# Patient Record
Sex: MALE | Race: WHITE | NOT HISPANIC OR LATINO | URBAN - METROPOLITAN AREA
[De-identification: names, ages, dates, MRNs, and addresses within clinical notes are randomized per-mention and may not be internally consistent; named-entity substitution may affect disease eponyms.]

---

## 2022-11-21 ENCOUNTER — OFFICE VISIT (OUTPATIENT)
Dept: PODIATRY | Facility: CLINIC | Age: 57
End: 2022-11-21
Payer: OTHER GOVERNMENT

## 2022-11-21 ENCOUNTER — HOSPITAL ENCOUNTER (OUTPATIENT)
Dept: RADIOLOGY | Facility: HOSPITAL | Age: 57
Discharge: HOME | End: 2022-11-21
Attending: PODIATRIST
Payer: OTHER GOVERNMENT

## 2022-11-21 VITALS
WEIGHT: 200 LBS | HEIGHT: 69 IN | SYSTOLIC BLOOD PRESSURE: 139 MMHG | BODY MASS INDEX: 29.62 KG/M2 | HEART RATE: 77 BPM | DIASTOLIC BLOOD PRESSURE: 82 MMHG

## 2022-11-21 DIAGNOSIS — M79.672 FOOT PAIN, BILATERAL: ICD-10-CM

## 2022-11-21 DIAGNOSIS — M79.671 FOOT PAIN, BILATERAL: ICD-10-CM

## 2022-11-21 DIAGNOSIS — G60.9 IDIOPATHIC PERIPHERAL NEUROPATHY: ICD-10-CM

## 2022-11-21 DIAGNOSIS — I87.2 VENOUS INSUFFICIENCY OF BOTH LOWER EXTREMITIES: ICD-10-CM

## 2022-11-21 DIAGNOSIS — L60.2 ONYCHOGRYPHOSIS: ICD-10-CM

## 2022-11-21 DIAGNOSIS — B35.1 ONYCHOMYCOSIS: Primary | ICD-10-CM

## 2022-11-21 PROCEDURE — 73630 XR FOOT COMPLETE 3 VIEW BILATERAL: ICD-10-PCS | Mod: 26,50,, | Performed by: RADIOLOGY

## 2022-11-21 PROCEDURE — 73630 X-RAY EXAM OF FOOT: CPT | Mod: TC,50,FY

## 2022-11-21 PROCEDURE — 73630 X-RAY EXAM OF FOOT: CPT | Mod: 26,50,, | Performed by: RADIOLOGY

## 2022-11-21 PROCEDURE — 11721 ROUTINE FOOT CARE: ICD-10-PCS | Mod: S$PBB,,, | Performed by: PODIATRIST

## 2022-11-21 PROCEDURE — 99203 PR OFFICE/OUTPT VISIT, NEW, LEVL III, 30-44 MIN: ICD-10-PCS | Mod: 25,S$PBB,, | Performed by: PODIATRIST

## 2022-11-21 PROCEDURE — 99204 OFFICE O/P NEW MOD 45 MIN: CPT | Mod: PBBFAC,PN | Performed by: PODIATRIST

## 2022-11-21 PROCEDURE — 99999 PR PBB SHADOW E&M-NEW PATIENT-LVL IV: CPT | Mod: PBBFAC,,, | Performed by: PODIATRIST

## 2022-11-21 PROCEDURE — 99203 OFFICE O/P NEW LOW 30 MIN: CPT | Mod: 25,S$PBB,, | Performed by: PODIATRIST

## 2022-11-21 PROCEDURE — 11721 DEBRIDE NAIL 6 OR MORE: CPT | Mod: PBBFAC,PN | Performed by: PODIATRIST

## 2022-11-21 PROCEDURE — 99999 PR PBB SHADOW E&M-NEW PATIENT-LVL IV: ICD-10-PCS | Mod: PBBFAC,,, | Performed by: PODIATRIST

## 2022-11-21 RX ORDER — CARVEDILOL 25 MG/1
1 TABLET ORAL 2 TIMES DAILY
COMMUNITY
Start: 2022-06-10

## 2022-11-21 RX ORDER — ALLOPURINOL 300 MG/1
300 TABLET ORAL DAILY
COMMUNITY

## 2022-11-21 RX ORDER — LISINOPRIL 40 MG/1
TABLET ORAL
COMMUNITY
Start: 2022-06-10 | End: 2023-06-11

## 2022-11-21 RX ORDER — OMEPRAZOLE 20 MG/1
CAPSULE, DELAYED RELEASE ORAL
COMMUNITY
Start: 2021-12-09 | End: 2022-12-10

## 2022-11-21 RX ORDER — FUROSEMIDE 40 MG/1
0.5 TABLET ORAL DAILY
COMMUNITY
Start: 2021-12-09 | End: 2022-12-10

## 2022-11-21 RX ORDER — HYDROXYCHLOROQUINE SULFATE 200 MG/1
TABLET, FILM COATED ORAL
COMMUNITY
Start: 2022-01-14 | End: 2023-01-15

## 2022-11-21 RX ORDER — FLUOXETINE HYDROCHLORIDE 20 MG/1
20 CAPSULE ORAL
COMMUNITY
Start: 2022-10-06

## 2022-11-21 RX ORDER — CARVEDILOL 25 MG/1
1 TABLET ORAL 2 TIMES DAILY
COMMUNITY
Start: 2022-06-10 | End: 2022-11-21 | Stop reason: SDUPTHER

## 2022-11-21 RX ORDER — FOLIC ACID 1 MG/1
1 TABLET ORAL DAILY
COMMUNITY

## 2022-11-21 NOTE — PROCEDURES
"Routine Foot Care    Date/Time: 11/21/2022 2:45 PM  Performed by: Payam Gordon DPM  Authorized by: Payam Gordon DPM     Time out: Immediately prior to procedure a "time out" was called to verify the correct patient, procedure, equipment, support staff and site/side marked as required.    Consent Done?:  Yes (Verbal)  Hyperkeratotic Skin Lesions?: No      Nail Care Type:  Debride  Location(s): All  (Left 1st Toe, Left 3rd Toe, Left 2nd Toe, Left 4th Toe, Left 5th Toe, Right 1st Toe, Right 2nd Toe, Right 3rd Toe, Right 4th Toe and Right 5th Toe)  Patient tolerance:  Patient tolerated the procedure well with no immediate complications     Used sterile nail nipper.      "

## 2022-11-21 NOTE — PROGRESS NOTES
"Subjective:      Patient ID: Sharif Castillo is a 57 y.o. male.    Chief Complaint: Routine Foot Care (Routine Foot Exam)    History of lupus and cardiomyopathy complaining of chronic intermittent pain to both feet in the arch after long periods of standing and walking throughout the day.  States that he achieves greater than 10,000 steps per day.  Works as a  at the [employer]. Retired .  Pain alleviated by rest.  Also complains of thickened in abnormally shaped toenails of both feet.    Vitals:    11/21/22 1501   BP: 139/82   Pulse: 77   Weight: 90.7 kg (200 lb)   Height: 5' 9" (1.753 m)   PainSc:   6   PainLoc: Foot      Past Medical History:   Diagnosis Date    CHF (congestive heart failure)     Chronic kidney disease, unspecified     Fatty liver     GERD (gastroesophageal reflux disease)     Gout, unspecified     Hypertension     Hypothyroidism, unspecified     Paroxysmal atrial fibrillation     PTSD (post-traumatic stress disorder)     Systemic lupus erythematosus, organ or system involvement unspecified        Past Surgical History:   Procedure Laterality Date    CARDIAC CATHETERIZATION      COLONOSCOPY      endoscopy      TONSILLECTOMY         Family History   Problem Relation Age of Onset    Lupus Mother     Heart disease Father     Diabetes Father        Social History     Socioeconomic History    Marital status: Unknown   Tobacco Use    Smoking status: Never    Smokeless tobacco: Never   Substance and Sexual Activity    Alcohol use: Yes     Alcohol/week: 2.0 standard drinks     Types: 2 Cans of beer per week     Comment: occasional    Drug use: Never       Current Outpatient Medications   Medication Sig Dispense Refill    allopurinoL (ZYLOPRIM) 300 MG tablet Take 300 mg by mouth once daily.      apixaban (ELIQUIS) 5 mg Tab TAKE ONE TABLET BY MOUTH TWICE A DAY TO PREVENT STROKE      carvediloL (COREG) 25 MG tablet Take 1 tablet by mouth 2 (two) times daily.      empagliflozin (JARDIANCE) 10 mg " tablet Take 1 tablet by mouth every morning.      EMPAGLIFLOZIN-LINAGLIPTIN ORAL Take 10 mg by mouth.      FERROUS SULFATE ORAL Take 1 tablet by mouth once daily.      FLUoxetine 20 MG capsule 20 mg.      folic acid (FOLVITE) 1 MG tablet Take 1 mg by mouth once daily.      furosemide (LASIX) 40 MG tablet Take 0.5 tablets by mouth once daily.      hydrOXYchloroQUINE (PLAQUENIL) 200 mg tablet TAKE TWO TABLETS BY MOUTH ONCE DAILY FOR RHEUMATOID ARTHRITIS      lisinopriL (PRINIVIL,ZESTRIL) 40 MG tablet TAKE ONE-HALF TABLET BY MOUTH EVERY DAY FOR HEART/BLOOD PRESSURE      methotrexate 2.5 mg/mL Soln Take 1 tablet by mouth once daily.      omeprazole (PRILOSEC) 20 MG capsule TAKE ONE CAPSULE BY MOUTH EVERY DAY AS NEEDED ON AN EMPTY STOMACH FOR ACID REFLUX      peg 400-propylene glycol 0.4-0.3 % Drop INSTILL ONE DROP IN EACH EYE FOUR TIMES A DAY AS NEEDED FOR DRY EYES       No current facility-administered medications for this visit.       Review of patient's allergies indicates:   Allergen Reactions    Penicillins Anaphylaxis, Nausea And Vomiting and Hives    Bee venom protein (honey bee) Hives and Nausea And Vomiting    Ciprofloxacin Hives and Rash         Review of Systems   Constitutional: Negative for chills, fever and malaise/fatigue.   HENT:  Negative for congestion and hearing loss.    Cardiovascular:  Negative for chest pain, claudication and leg swelling.   Respiratory:  Negative for cough and shortness of breath.    Skin:  Positive for color change, dry skin and nail changes.   Musculoskeletal:  Positive for joint pain and stiffness. Negative for back pain, muscle cramps and muscle weakness.   Gastrointestinal:  Negative for nausea and vomiting.   Neurological:  Negative for numbness, paresthesias and weakness.   Psychiatric/Behavioral:  Negative for altered mental status.          Objective:      Physical Exam  Constitutional:       General: He is not in acute distress.     Appearance: He is not ill-appearing.    Cardiovascular:      Pulses:           Dorsalis pedis pulses are 2+ on the right side and 2+ on the left side.        Posterior tibial pulses are 1+ on the right side and 1+ on the left side.      Comments: Nonpitting edema to lower extremity bilateral with varicosities and telangiectasias.  No hair growth bilateral lower extremity.  No rubor noted bilateral foot.  Skin temp is warm to cool bilateral foot.  Musculoskeletal:      Comments: No pain with range of motion or manual muscle strength testing bilateral foot ankle.    Mild pain on palpation along the dorsal metatarsal shafts 2, 3 bilateral foot.  No pain at the distal intermetatarsal spaces 2-4 bilateral foot.  Negative Lachman test toes 2-5 bilateral foot.  Mild semi rigid flexion contracture of toes 2-4 with adductovarus 5th toe bilateral foot.    Negative Tinel sign overlying the posterior tibial and deep peroneal nerves bilateral foot.   Feet:      Right foot:      Protective Sensation: 10 sites tested.  8 sites sensed.      Skin integrity: Dry skin present.      Toenail Condition: Right toenails are abnormally thick and long. Fungal disease present.     Left foot:      Protective Sensation: 10 sites tested.  8 sites sensed.      Skin integrity: Dry skin present.      Toenail Condition: Left toenails are abnormally thick and long. Fungal disease present.  Skin:     General: Skin is warm.      Capillary Refill: Capillary refill takes 2 to 3 seconds.      Findings: No ecchymosis or erythema.      Nails: There is no clubbing.      Comments: Toenails 1-5 bilateral foot are moderately elongated, thickened and discolored yellow with moderate loosening and underlying debris.  Left 2nd and 3rd, and right 2nd toenail are severely dystrophic and growing in a superior direction.    Skin is dry to foot bilateral.   Neurological:      Mental Status: He is alert and oriented to person, place, and time.      Sensory: Sensory deficit present.      Motor: Motor function  is intact.             Assessment:       Encounter Diagnoses   Name Primary?    Onychomycosis Yes    Onychogryphosis     Venous insufficiency of both lower extremities     Idiopathic peripheral neuropathy     Foot pain, bilateral          Plan:       Sharif was seen today for routine foot care.    Diagnoses and all orders for this visit:    Onychomycosis  -     Routine Foot Care    Onychogryphosis  -     Routine Foot Care    Venous insufficiency of both lower extremities  -     Routine Foot Care  -     ORTHOTIC DEVICE (DME)    Idiopathic peripheral neuropathy  -     Routine Foot Care  -     ORTHOTIC DEVICE (DME)    Foot pain, bilateral  -     X-Ray Foot Complete 3 view Bilateral; Future  -     ORTHOTIC DEVICE (DME)      I counseled the patient on his conditions, their implications and medical management.    Shoe inspection. Patient instructed on proper foot hygeine. We discussed wearing proper shoe gear, daily foot inspections, never walking without protective shoe gear, never putting sharp instruments to feet.    Routine foot care per attached note.  Patient relates relief following the procedure. He will continue to monitor the areas daily, inspect his feet, wear protective shoe gear when ambulatory, moisturizer to maintain skin integrity.    Prescription for custom molded orthotics molded to neutral with metatarsal pads dispensed.  [facility]  Also discussed that he should consider receiving [facility] from the [facility] on a regular basis since it is a covered benefit.    Patient's pain is more from standing and walking on his feet for extended periods of time which could be related to insufficient shoe gear in which she orthotic should assist with pressure reduction and help alleviate his pain.    Note the beginning of signs consistent with peripheral neuropathy.  Recommend monitoring at this time.  May be related to lupus.    RTC p.r.n. as discussed.    A portion of this note was generated by  voice recognition software and may contain spelling and grammar errors.

## 2022-11-22 ENCOUNTER — TELEPHONE (OUTPATIENT)
Dept: PODIATRY | Facility: CLINIC | Age: 57
End: 2022-11-22
Payer: OTHER GOVERNMENT

## 2022-11-22 ENCOUNTER — PATIENT MESSAGE (OUTPATIENT)
Dept: PODIATRY | Facility: CLINIC | Age: 57
End: 2022-11-22
Payer: OTHER GOVERNMENT

## 2022-11-22 PROBLEM — K21.9 GASTROESOPHAGEAL REFLUX DISEASE: Status: ACTIVE | Noted: 2022-11-22

## 2022-11-22 PROBLEM — N18.30 STAGE 3 CHRONIC KIDNEY DISEASE: Status: ACTIVE | Noted: 2022-11-22

## 2022-11-22 PROBLEM — E03.9 HYPOTHYROIDISM: Status: ACTIVE | Noted: 2022-11-22

## 2022-11-22 PROBLEM — F43.10 POSTTRAUMATIC STRESS DISORDER: Status: ACTIVE | Noted: 2022-11-22

## 2022-11-22 PROBLEM — H25.13 AGE-RELATED NUCLEAR CATARACT, BILATERAL: Status: ACTIVE | Noted: 2022-11-22

## 2022-11-22 PROBLEM — I10 PRIMARY HYPERTENSION: Status: ACTIVE | Noted: 2022-11-22

## 2022-11-22 PROBLEM — G47.30 SLEEP APNEA: Status: ACTIVE | Noted: 2022-11-22

## 2022-11-22 PROBLEM — J44.9 CHRONIC OBSTRUCTIVE PULMONARY DISEASE, UNSPECIFIED: Status: ACTIVE | Noted: 2022-11-22

## 2022-11-22 PROBLEM — I50.9 CONGESTIVE HEART FAILURE: Status: ACTIVE | Noted: 2022-11-22

## 2022-11-22 PROBLEM — M32.8 OTHER FORMS OF SYSTEMIC LUPUS ERYTHEMATOSUS: Status: ACTIVE | Noted: 2022-11-22

## 2022-11-22 PROBLEM — I48.91 ATRIAL FIBRILLATION: Status: ACTIVE | Noted: 2022-11-22

## 2022-11-22 NOTE — TELEPHONE ENCOUNTER
Patient has a history of CHF and chronic anti-coagulation. I defer that prescription to his PCP because he should not be on it.

## 2022-11-22 NOTE — TELEPHONE ENCOUNTER
----- Message from Dorothy Garcia RN sent at 11/22/2022 10:14 AM CST -----  Contact: Pt  Dr Gordon:    Did you want to given this gentleman a prescription for Meloxicam or does he need to see his PCP regarding this?    Thank you    Zulema  ----- Message -----  From: [place]  Sent: 11/22/2022  10:02 AM CST  To: Evan Hare Staff    .Type:  RX Refill Request    Who Called: Pt  Refill or New Rx:Refill  RX Name and Strength:Meloxicam 5mg  How is the patient currently taking it? (ex. 1XDay):once a day  Is this a 30 day or 90 day RX:90  Preferred Pharmacy with phone number:  [id number] - [place] - [address] [facility]   Phone: [phone]  Fax:  [phone]  Local or Mail Order:local  Ordering Provider:Dr. Gordon  Would the patient rather a call back or a response via [facility]? Call  Best Call Back Number:[phone]

## 2022-11-22 NOTE — TELEPHONE ENCOUNTER
Attempted to reach Mr Castillo but he was unavailable. Left message with informing him that Dr. Gordon cannot refill his meloxicam prescription and would like for Mr Castillo to contact his PCP in regards to that prescription due to pt's history of CHF/anticoagulation. Phone number left for for patient to call if further assistance is needed. Message also sent to patient through patient portal.

## 2022-11-23 ENCOUNTER — PATIENT MESSAGE (OUTPATIENT)
Dept: PODIATRY | Facility: CLINIC | Age: 57
End: 2022-11-23
Payer: OTHER GOVERNMENT

## 2024-10-28 ENCOUNTER — PATIENT MESSAGE (OUTPATIENT)
Dept: GASTROENTEROLOGY | Facility: CLINIC | Age: 59
End: 2024-10-28
Payer: OTHER GOVERNMENT